# Patient Record
(demographics unavailable — no encounter records)

---

## 2024-11-17 NOTE — ASSESSMENT
[FreeTextEntry1] : The patient is 3.5 months out from her open reduction internal fixation of the left intertrochanteric hip fracture on 7/26/24.  Overall she is doing beautifully. The patient did stop her home PT and still has a limp. She has restarted with PT outpatient. She is starting to test herself with ambulating without her walker. She has not needed pain medications. She has no groin or lateral hip pain but notes quadricep soreness.   Left hip exam: Neurovascularly intact. Sensation intact.  Examination left hip reveals well-healed scar with no signs of infection or DVT.  Her leg length appears equal.  She has a positive Trendelenburg sign when attempting to stand on the left leg only.  She has no point tenderness.   The patient is doing very well postop. She will continue with PT and will return in 8 weeks for final clearance.

## 2024-11-17 NOTE — HISTORY OF PRESENT ILLNESS
[Dull/Aching] : dull/aching [] : yes [FreeTextEntry5] : 84 y/o F presents for f/u eval today. Pt reports recovery is going well. Minimal soreness.

## 2024-11-17 NOTE — HISTORY OF PRESENT ILLNESS
[Dull/Aching] : dull/aching [] : yes [FreeTextEntry5] : 86 y/o F presents for f/u eval today. Pt reports recovery is going well. Minimal soreness.

## 2025-03-05 NOTE — ASSESSMENT
[FreeTextEntry1] : Cynthia is a 85 yr old female, who I was asked to see in consultation for evaluation of thyroid nodule Daughter yomaira, says she fell in 7/24 had left leg fracture s/p ORIF. The this year she has  had 2 falls again, then had CT head and neck then.  She had CT neck in 2/25 showed 6 cm thyroid nodule.  No past h/o thyroid disease   thyroid nodule: I have discussed with the patient the incidence of thyroid nodules (fairly high) and the incidence of thyroid cancer (fairly low). We also discussed what can be the worrisome features of malignant nodule and increased incidence of thyroid cancer in patients >60 years old. I told the patient that the 4 cytopathologic results from FNA biopsy include 1) benign; 2) malignant; 3) inadequate sample; and 4) indeterminate.  However will first get neck ultrasound and then incase of large nodule will proceed with FNA. Will also get thyroid blood work.  RTc in 1 yr or sooner if needed.

## 2025-03-05 NOTE — REVIEW OF SYSTEMS
[Fatigue] : no fatigue [Decreased Appetite] : appetite not decreased [Recent Weight Gain (___ Lbs)] : no recent weight gain [Recent Weight Loss (___ Lbs)] : no recent weight loss [Visual Field Defect] : no visual field defect [Dysphagia] : dysphagia [Chest Pain] : no chest pain [Palpitations] : no palpitations [Lower Ext Edema] : no lower extremity edema [Shortness Of Breath] : no shortness of breath [Cough] : no cough [Nausea] : no nausea [Constipation] : no constipation [Abdominal Pain] : no abdominal pain [Vomiting] : no vomiting [Diarrhea] : no diarrhea [Polyuria] : no polyuria [Joint Pain] : no joint pain [Acanthosis] : no acanthosis  [Dry Skin] : no dry skin [Headaches] : no headaches [Dizziness] : no dizziness [Tremors] : no tremors [Pain/Numbness of Digits] : no pain/numbness of digits [Depression] : no depression [Cold Intolerance] : no cold intolerance [Heat Intolerance] : no heat intolerance [Easy Bruising] : no tendency for easy bruising [Swelling] : no swelling

## 2025-03-05 NOTE — HISTORY OF PRESENT ILLNESS
[FreeTextEntry1] : Cynthia is a 85 yr old female, who I was asked to see in consultation for evaluation of thyroid nodule Daughter yomaira, says she fell in 7/24 had left leg fracture s/p ORIF. The this year she has  had 2 falls again, then had CT head and neck then.  She had CT neck in 2/25 showed 6 cm thyroid nodule.  No family h/o thyroid disorder or cancer.no h/o neck radiation.  Occ . choking on pills. Was told she had GERD. Has had some hoarseness on voice fir a few months. .Denies heat or cold intolerance, no weight changes, no constipation or diarrhea, no palpitations, energy level fair, no skin or hair changes.

## 2025-03-05 NOTE — PHYSICAL EXAM
[Alert] : alert [Well Nourished] : well nourished [No Acute Distress] : no acute distress [Normal Sclera/Conjunctiva] : normal sclera/conjunctiva [No Neck Mass] : no neck mass was observed [No LAD] : no lymphadenopathy [Thyroid Not Enlarged] : the thyroid was not enlarged [No Respiratory Distress] : no respiratory distress [No Accessory Muscle Use] : no accessory muscle use [Clear to Auscultation] : lungs were clear to auscultation bilaterally [Normal S1, S2] : normal S1 and S2 [Normal Rate] : heart rate was normal [Regular Rhythm] : with a regular rhythm [Normal Bowel Sounds] : normal bowel sounds [Not Tender] : non-tender [Soft] : abdomen soft [Normal Gait] : normal gait [No Tremors] : no tremors [Oriented x3] : oriented to person, place, and time [Normal Affect] : the affect was normal [Normal Insight/Judgement] : insight and judgment were intact [Normal Mood] : the mood was normal

## 2025-03-06 NOTE — HISTORY OF PRESENT ILLNESS
[Family Member] : family member [FreeTextEntry1] : OTILIA [de-identified] : 85-year-old female for annual wellness visit.  Accompanied by daughter.  History of dementia.  Lives at [facility] in [place].  Not oriented to time.  Speaks well.  Did see neurology in the past.  Patient is not currently on any medication for dementia due to side effects.  Seeing Endo for incidental finding of thyroid nodule.  History of psoriasis.  Requesting refill of liquid clobetasol.

## 2025-03-06 NOTE — PLAN
[FreeTextEntry1] : 85-year-old female for annual wellness visit.  Labs as ordered.  Daughter declined further preventative screening.  Dementia.  Follow-up with neuro.  Thyroid nodule.  Keep follow-up with Endo.  All questions answered.  Patient voiced understanding and in agreement to plan.  Return to clinic as recommended.

## 2025-03-12 NOTE — ASSESSMENT
[FreeTextEntry1] : The patient comes in today for evaluation of her left hip.  She had open reduction internal fixation of a left intertrochanteric hip fracture on July 25, 2024.  She is doing very well with regards to the pain, unfortunately, however, she has had several episodes of falls since February 7, 2025.  She has noticed a new onset foot drop which was not present previously.  She denies any discomfort in the left hip or groin.  Examination of the left lower extremity shows the foot drop.  This is a new finding for her.  With regards to the left hip she has a full painless range of motion with no obvious weakness.  Her scars are well-healed there is no signs of infection.  Her leg lengths are equal.  X-rays done in the office today of the left hip 2 views and the AP pelvis shows the open reduction internal fixation left hip to be healed in anatomic position with appropriate position of the hardware.  There is no secondary arthritis in the left hip.  There is no arthritis in the right hip.  There are no obvious tumors, mass or calcifications seen.  The plan at this time is physical therapy for the left hip and foot.  Will get her an ankle-foot orthosis for the left leg for the foot drop and we will have her see a neurologist to determine the cause of the foot drop.  I will see her back in the office as needed only.

## 2025-03-12 NOTE — HISTORY OF PRESENT ILLNESS
[0] : 0 [] : no [FreeTextEntry5] : 85 year old F presents for a fu. pt had surgery 7/26/24 with dr Bird . after the surgery pt was recovering well. but had 3 falls since 2/7/24 2/9/24 2/12/24. pt noted to not have any pain

## 2025-03-24 NOTE — PLAN
[FreeTextEntry1] : 85-year-old female for follow-up dysphagia.  Thyroid nodule biopsy pending.  ENT and neuro eval pending as well.  Suggest GI referral.  All questions answered.  Patient voiced understanding and in agreement to plan.  Return to clinic as recommended.

## 2025-03-24 NOTE — HISTORY OF PRESENT ILLNESS
[FreeTextEntry8] : 85-year-old female accompanied by daughter Karina.  Concern for trouble swallowing.  Not particularly related to solids or liquids.  Recent finding of thyroid nodule.  Patient daughter does have biopsy scheduled.  Neuro and ENT evaluation upcoming.

## 2025-04-05 NOTE — HISTORY OF PRESENT ILLNESS
[de-identified] : 04/04/2025 - 85 Y F presents for initial evaluation. Saw. Dr Bird for left LISSA. Had subsequent two falls 02/07/2025, 02/09/2025. Today she reports zero pain, since the falls she has voice changes, difficulty swallowing, left foot drop - neurology sent her for comprehensive spine studies cervical thoracic lumbar performed at [facility]. Brain MRI is normal. She is in the process of being worked up for neurological diseases without any definitive diagnoses. Left foot drop present last few weeks and is wearing left AFO. Started physical therapy one week ago for general strengthening, gait/balance, hip flexor strengthening and notes little bit of progress thus far. Prior to the falls denies any general medical health abnormalities.    The patient is a 85 year female who presents today complaining of low back pain, L2 fracture Date of Injury/Onset:  Pain:    At Rest: 0/10  With Activity:  0/10  Injury: Fell 02/07/2025, 02/09/2025 Quality of symptoms: left foot drop, weakness in left leg, voice change, difficulty swallowing Improves with: nothing Worse with: sitting to standing Prior treatment: no Prior Imaging: [facility] Cervical, Thoracic, and Lumbar Spine 03/2025, [facility] Additional Information: None

## 2025-04-05 NOTE — DISCUSSION/SUMMARY
[de-identified] : MRIs cervical, thoracic, and lumbar reviewed with the patient and her daughter today demonstrating: Acute/ subacute L2 compression fracture. Moderate spinal stenosis L3/4.  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 Fracture appears to be clinically healed. No brace of kyphoplasty is indicated. She will continue current physical therapy trial for strengthening, gait/balance, hip flexor strengthening. Continue to follow up with neurology for further evaluation. F/u after EMG for further evaluation.   Cumulative encounter duration exceeded 45 minutes.   Prior to appointment and during encounter with patient extensive medical records were reviewed including but not limited to, hospital records, outpatient records, imaging results, and lab data. During this appointment the patient was examined, diagnoses were discussed and explained in a face to face manner. In addition extensive time was spent reviewing aforementioned diagnostic studies. Counseling including abnormal image results, differential diagnoses, treatment options, risk and benefits, lifestyle changes, current condition, and current medications was performed. Patient's comments, questions, and concerns were addressed and patient verbalized understanding. Based on this patient's presentation at our office, which is an orthopedic spine surgeon's office, this patient inherently / intrinsically has a risk, however minute, of developing issues such as Cauda equina syndrome, bowel and bladder changes, or progression of motor or neurological deficits such as paralysis which may be permanent.   MARTHA ELDRIDGE Acting as a Scribe for Martha Hernandez, attest that this documentation has been prepared under the direction and in the presence of Provider Camden Alston MD.

## 2025-04-05 NOTE — HISTORY OF PRESENT ILLNESS
[de-identified] : 04/04/2025 - 85 Y F presents for initial evaluation. Saw. Dr Bird for left LISSA. Had subsequent two falls 02/07/2025, 02/09/2025. Today she reports zero pain, since the falls she has voice changes, difficulty swallowing, left foot drop - neurology sent her for comprehensive spine studies cervical thoracic lumbar performed at [facility]. Brain MRI is normal. She is in the process of being worked up for neurological diseases without any definitive diagnoses. Left foot drop present last few weeks and is wearing left AFO. Started physical therapy one week ago for general strengthening, gait/balance, hip flexor strengthening and notes little bit of progress thus far. Prior to the falls denies any general medical health abnormalities.    The patient is a 85 year female who presents today complaining of low back pain, L2 fracture Date of Injury/Onset:  Pain:    At Rest: 0/10  With Activity:  0/10  Injury: Fell 02/07/2025, 02/09/2025 Quality of symptoms: left foot drop, weakness in left leg, voice change, difficulty swallowing Improves with: nothing Worse with: sitting to standing Prior treatment: no Prior Imaging: [facility] Cervical, Thoracic, and Lumbar Spine 03/2025, [facility] Additional Information: None

## 2025-04-05 NOTE — PHYSICAL EXAM
[] : negative equivocal straight leg raise [de-identified] : mildly diminished sensation lateral left leg

## 2025-04-05 NOTE — DATA REVIEWED
[FreeTextEntry1] : On my interpretation of these images & reports Lumbar Spine MRI [facility] 03/2025  Acute/ subacute L2 compression fracture. Moderate spinal stenosis L3/4.   On my interpretation of these images & reports Thoracic MRI [facility] 03/2025  Multi-level degenerative disease without stenosis  On my interpretation of these images & reports Cervical Spine MRI [facility] 03/2025  Multi-level degenerative disease without high grade stenosis or cord compression  I stop paperwork reviewed PT progress notes reviewed Ortho progress notes reviewed MED progress notes reviewed

## 2025-04-05 NOTE — PHYSICAL EXAM
[] : light touch is not intact throughout both lower extremities [de-identified] : mildly diminished sensation lateral left leg

## 2025-04-05 NOTE — DISCUSSION/SUMMARY
[de-identified] : MRIs cervical, thoracic, and lumbar reviewed with the patient and her daughter today demonstrating: Acute/ subacute L2 compression fracture. Moderate spinal stenosis L3/4.  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 Fracture appears to be clinically healed. No brace of kyphoplasty is indicated. She will continue current physical therapy trial for strengthening, gait/balance, hip flexor strengthening. Continue to follow up with neurology for further evaluation. F/u after EMG for further evaluation.   Cumulative encounter duration exceeded 45 minutes.   Prior to appointment and during encounter with patient extensive medical records were reviewed including but not limited to, hospital records, outpatient records, imaging results, and lab data. During this appointment the patient was examined, diagnoses were discussed and explained in a face to face manner. In addition extensive time was spent reviewing aforementioned diagnostic studies. Counseling including abnormal image results, differential diagnoses, treatment options, risk and benefits, lifestyle changes, current condition, and current medications was performed. Patient's comments, questions, and concerns were addressed and patient verbalized understanding. Based on this patient's presentation at our office, which is an orthopedic spine surgeon's office, this patient inherently / intrinsically has a risk, however minute, of developing issues such as Cauda equina syndrome, bowel and bladder changes, or progression of motor or neurological deficits such as paralysis which may be permanent.   MARTHA ELDRIDGE Acting as a Scribe for Martha Hernandez, attest that this documentation has been prepared under the direction and in the presence of Provider Camden Alston MD.

## 2025-04-05 NOTE — DATA REVIEWED
[FreeTextEntry1] : On my interpretation of these images & reports Lumbar Spine MRI [facility] 03/2025  Acute/ subacute L2 compression fracture. Moderate spinal stenosis L3/4.   On my interpretation of these images & reports Thoracic MRI [facility] 03/2025  Multi-level degenerative disease without stenosis  On my interpretation of these images & reports Cervical Spine MRI [facility] 03/2025  Multi-level degenerative disease without high grade stenosis or cord compression  I stop paperwork reviewed PT progress notes reviewed Ortho progress notes reviewed MED progress notes reviewed

## 2025-04-15 NOTE — ASSESSMENT
Reviewed and agree with ACT and AAP [FreeTextEntry1] : 86 yo female with history of new onset hoarseness and dysphagia with normal neurologic and ENT evaluation.  Does not appear to be a primary gastrointestinal issue as a cause for her symptoms.  Would recommend modified barium swallow for evaluation.

## 2025-04-15 NOTE — HISTORY OF PRESENT ILLNESS
[FreeTextEntry1] : Ms. BRIANDA CASTORENA is a 85 year old female with history of hoarse voice and dysphagia which has been new in onset.  Patient has no complaints of heartburn or true dysphagia for certain foods.  There has been a significant weight loss which patient and family ascribed to life events.  Patient had extensive neurologic workup for symptoms which was normal, and ENT evaluation which also was normal.  Vocal cords were normal by report.  There was no response to use of empiric omeprazole. Patient has a history of a thyroid nodule which was biopsied and told it was benign.

## 2025-04-15 NOTE — PHYSICAL EXAM

## 2025-04-15 NOTE — REASON FOR VISIT
[Consultation] : a consultation visit [Family Member] : family member [FreeTextEntry1] : history of hoarse voice and dysphagia